# Patient Record
Sex: MALE
[De-identification: names, ages, dates, MRNs, and addresses within clinical notes are randomized per-mention and may not be internally consistent; named-entity substitution may affect disease eponyms.]

---

## 2019-08-14 ENCOUNTER — OTHER (OUTPATIENT)
Age: 34
End: 2019-08-14

## 2019-08-14 PROBLEM — Z00.00 ENCOUNTER FOR PREVENTIVE HEALTH EXAMINATION: Status: ACTIVE | Noted: 2019-08-14

## 2019-08-19 ENCOUNTER — APPOINTMENT (OUTPATIENT)
Dept: ORTHOPEDIC SURGERY | Facility: CLINIC | Age: 34
End: 2019-08-19
Payer: COMMERCIAL

## 2019-08-19 DIAGNOSIS — M75.22 BICIPITAL TENDINITIS, LEFT SHOULDER: ICD-10-CM

## 2019-08-19 PROCEDURE — 99203 OFFICE O/P NEW LOW 30 MIN: CPT

## 2019-08-19 PROCEDURE — 73080 X-RAY EXAM OF ELBOW: CPT | Mod: LT

## 2019-08-19 RX ORDER — MELOXICAM 7.5 MG/1
7.5 TABLET ORAL
Qty: 21 | Refills: 0 | Status: COMPLETED | COMMUNITY
Start: 2019-08-19 | End: 2019-09-09

## 2019-08-19 NOTE — PHYSICAL EXAM
[de-identified] : Physical Exam:\par General: Well appearing, no acute distress\par Neurologic: A&Ox3, No focal deficits\par Head: NCAT without abrasions, lacerations, or ecchymosis to head, face, or scalp\par Eyes: No scleral icterus, no gross abnormalities\par Respiratory: Equal chest wall expansion bilaterally, no accessory muscle use\par Lymphatic: No lymphadenopathy palpated\par Skin: Warm and dry\par Psychiatric: Normal mood and affect\par \par Left Elbow Exam\par \par Skin: Clean, dry, intact. No ecchymosis. No swelling. No palpable joint effusion.\par ROM: RIGHT  0-140, full supination/pronation.  LEFT 0-140, full supination/pronation.\par Painful ROM: None\par Tenderness: [No] medial epicondyle pain. [No] lateral epicondyle pain. [No] olecranon pain. No pain at radial head.\par Strength: 5/5 elbow flexion, 5/5 elbow extension, 5/5 supination, 5/5 pronation\par Stability: Stable to vaus/valgus stress\par Vasc: 2+ radial pulse, <2s cap refill\par Sensation: In tact to light touch throughout\par Neuro: Negative tinels at ulnar canal, AIN/PIN/Ulnar nerve in tact to motor/sensation.\par Tenderness noted over the biceps insertion. Pain with resisted supination of left arm. A symmetric strength with the left one being slightly weaker. No pain or weakness with flexion of the elbow. [de-identified] : 3 views of the left elbow were performed today and available for me to review. No fracture. No dislocation. No other deformities.

## 2019-08-19 NOTE — HISTORY OF PRESENT ILLNESS
[4] : an average pain level of 4/10 [Improving] : improving [3] : a current pain level of 3/10 [Intermit.] : ~He/She~ states the symptoms seem to be intermittent [6] : a maximum pain level of 6/10 [Rest] : relieved by rest [Lifting] : worsened by lifting [de-identified] : JONY is a 34 year male who presents today with left elbow pain he describes as burning in nature. He admits to catching his daughter in July where he felt pain to the area. He denies feeling a pop, he denies seeing bruising. He doesn’t have a history of other injury. Since then he has tried activity modification, rest, ace wrap and voltaren gel of which all give him minimal relief. He denies injection to this elbow or PT. He denies numbness/tingling.

## 2020-02-25 NOTE — DISCUSSION/SUMMARY
[de-identified] : JONY is a 34 year male who presents today with left elbow pain he describes as burning in nature. He admits to catching his daughter in July where he felt pain to the area. He doesn’t have a history of other injury. Since then he has tried activity modification, rest, ace wrap and voltaren gel of which all give him minimal relief. He denies injection to this elbow or PT. He denies numbness/tingling..\par \par Reviewed patients 3 view XRays from today's visit which are free of fracture, no dislocation, no joint space narrowing and no degenerative changes. Based off of his clinical exam I believe his primary issue is biceps tendonitis. Educated pt conservative measures should be taken at this time that include PT for 2-4 weeks while using Meloxicam x 21 days with food to decrease inflammation. We will see him back at that time. Pt agrees to the latter plan and does not have any further questions.\par 
5